# Patient Record
Sex: FEMALE | Race: WHITE | NOT HISPANIC OR LATINO | Employment: FULL TIME | URBAN - METROPOLITAN AREA
[De-identification: names, ages, dates, MRNs, and addresses within clinical notes are randomized per-mention and may not be internally consistent; named-entity substitution may affect disease eponyms.]

---

## 2022-04-19 DIAGNOSIS — Z36.89 ENCOUNTER FOR FETAL ANATOMIC SURVEY: ICD-10-CM

## 2022-04-19 DIAGNOSIS — O28.3 ABNORMAL FETAL ULTRASOUND: Primary | ICD-10-CM

## 2022-04-27 ENCOUNTER — PATIENT MESSAGE (OUTPATIENT)
Dept: MATERNAL FETAL MEDICINE | Facility: CLINIC | Age: 27
End: 2022-04-27
Payer: MEDICAID

## 2022-04-28 ENCOUNTER — PROCEDURE VISIT (OUTPATIENT)
Dept: MATERNAL FETAL MEDICINE | Facility: CLINIC | Age: 27
End: 2022-04-28
Attending: OBSTETRICS & GYNECOLOGY
Payer: MEDICAID

## 2022-04-28 VITALS
DIASTOLIC BLOOD PRESSURE: 65 MMHG | SYSTOLIC BLOOD PRESSURE: 115 MMHG | HEIGHT: 63 IN | BODY MASS INDEX: 34.3 KG/M2 | WEIGHT: 193.56 LBS

## 2022-04-28 DIAGNOSIS — O28.3 ABNORMAL FETAL ULTRASOUND: ICD-10-CM

## 2022-04-28 DIAGNOSIS — Z36.89 ENCOUNTER FOR ULTRASOUND TO ASSESS FETAL GROWTH: ICD-10-CM

## 2022-04-28 DIAGNOSIS — Z36.89 ENCOUNTER FOR FETAL ANATOMIC SURVEY: ICD-10-CM

## 2022-04-28 DIAGNOSIS — Z87.51 HISTORY OF PRETERM LABOR: ICD-10-CM

## 2022-04-28 DIAGNOSIS — O35.EXX0 FETAL RENAL ANOMALY, SINGLE GESTATION: ICD-10-CM

## 2022-04-28 DIAGNOSIS — O35.EXX0 FETAL RENAL ANOMALY, SINGLE GESTATION: Primary | ICD-10-CM

## 2022-04-28 PROCEDURE — 3008F BODY MASS INDEX DOCD: CPT | Mod: CPTII,,, | Performed by: OBSTETRICS & GYNECOLOGY

## 2022-04-28 PROCEDURE — 3008F PR BODY MASS INDEX (BMI) DOCUMENTED: ICD-10-PCS | Mod: CPTII,,, | Performed by: OBSTETRICS & GYNECOLOGY

## 2022-04-28 PROCEDURE — 99204 PR OFFICE/OUTPT VISIT, NEW, LEVL IV, 45-59 MIN: ICD-10-PCS | Mod: 25,S$PBB,TH, | Performed by: OBSTETRICS & GYNECOLOGY

## 2022-04-28 PROCEDURE — 3078F DIAST BP <80 MM HG: CPT | Mod: CPTII,,, | Performed by: OBSTETRICS & GYNECOLOGY

## 2022-04-28 PROCEDURE — 99999 PR PBB SHADOW E&M-EST. PATIENT-LVL III: ICD-10-PCS | Mod: PBBFAC,,, | Performed by: OBSTETRICS & GYNECOLOGY

## 2022-04-28 PROCEDURE — 1159F MED LIST DOCD IN RCRD: CPT | Mod: CPTII,,, | Performed by: OBSTETRICS & GYNECOLOGY

## 2022-04-28 PROCEDURE — 3074F SYST BP LT 130 MM HG: CPT | Mod: CPTII,,, | Performed by: OBSTETRICS & GYNECOLOGY

## 2022-04-28 PROCEDURE — 99213 OFFICE O/P EST LOW 20 MIN: CPT | Mod: PBBFAC,TH | Performed by: OBSTETRICS & GYNECOLOGY

## 2022-04-28 PROCEDURE — 99999 PR PBB SHADOW E&M-EST. PATIENT-LVL III: CPT | Mod: PBBFAC,,, | Performed by: OBSTETRICS & GYNECOLOGY

## 2022-04-28 PROCEDURE — 1160F RVW MEDS BY RX/DR IN RCRD: CPT | Mod: CPTII,,, | Performed by: OBSTETRICS & GYNECOLOGY

## 2022-04-28 PROCEDURE — 3078F PR MOST RECENT DIASTOLIC BLOOD PRESSURE < 80 MM HG: ICD-10-PCS | Mod: CPTII,,, | Performed by: OBSTETRICS & GYNECOLOGY

## 2022-04-28 PROCEDURE — 99204 OFFICE O/P NEW MOD 45 MIN: CPT | Mod: 25,S$PBB,TH, | Performed by: OBSTETRICS & GYNECOLOGY

## 2022-04-28 PROCEDURE — 76811 OB US DETAILED SNGL FETUS: CPT | Mod: PBBFAC | Performed by: OBSTETRICS & GYNECOLOGY

## 2022-04-28 PROCEDURE — 1160F PR REVIEW ALL MEDS BY PRESCRIBER/CLIN PHARMACIST DOCUMENTED: ICD-10-PCS | Mod: CPTII,,, | Performed by: OBSTETRICS & GYNECOLOGY

## 2022-04-28 PROCEDURE — 3074F PR MOST RECENT SYSTOLIC BLOOD PRESSURE < 130 MM HG: ICD-10-PCS | Mod: CPTII,,, | Performed by: OBSTETRICS & GYNECOLOGY

## 2022-04-28 PROCEDURE — 1159F PR MEDICATION LIST DOCUMENTED IN MEDICAL RECORD: ICD-10-PCS | Mod: CPTII,,, | Performed by: OBSTETRICS & GYNECOLOGY

## 2022-04-28 PROCEDURE — 76811 US MFM PROCEDURE (VIEWPOINT): ICD-10-PCS | Mod: 26,S$PBB,, | Performed by: OBSTETRICS & GYNECOLOGY

## 2022-04-28 RX ORDER — NIFEDIPINE 10 MG/1
10 CAPSULE ORAL EVERY 8 HOURS
COMMUNITY

## 2022-04-28 RX ORDER — PROMETHAZINE HYDROCHLORIDE 25 MG/1
25 TABLET ORAL EVERY 4 HOURS
COMMUNITY

## 2022-04-28 RX ORDER — HYDROXYPROGESTERONE CAPROATE 250 MG/ML
250 INJECTION INTRAMUSCULAR
COMMUNITY

## 2022-04-28 RX ORDER — NAPROXEN SODIUM 220 MG/1
81 TABLET, FILM COATED ORAL DAILY
Refills: 0 | COMMUNITY
Start: 2022-04-28 | End: 2022-09-25

## 2022-04-28 NOTE — ASSESSMENT & PLAN NOTE
Today Olivia's fetus was diagnosed with bilateral renal pyelectasis.  Based on the prenatal rating system proposed by Daxa et al in the Journal of Pediatric Urology (2014), we grade urinary tract dilation in a manner that correlates with long-term risk (UTD A1 or A2-3). By this criteria, her measurement of 7.5 mm on the right and 6.8 mm on the left without other abnormal findings puts her fetus in the moderate risk category, A2-3. The cause of urinary tract dilation is most often physiologic, particularly for fetuses in the low risk category.  Other causes can include anatomic obstructions, with a prevalence ranging between 5 and 30% of diagnosed fetuses. There is a small association with trisomy 21, with a relative risk of 1.6, though patient's low risk cell free DNA precludes further work up.    Recommendations:  1. Follow up US at 32 weeks  2. Will determine at that time need for prenatal Pediatric Urology consultation on  imaging based on persistence/degree of dilation  3. Fetal UTD does not typically alter place or mode of delivery

## 2022-04-28 NOTE — ASSESSMENT & PLAN NOTE
labor in 3 prior pregnancies with subsequent term deliveries in all  Current regimen (2022):   - 17-OHP weekly injections  - Nifedipine IR 10 mg PO every 4-6 hours as needed for  contractions    Recommendations:  1. 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 Avoid nifedipine in the setting of hypotension. Avoid beta-2 agonists altogether.

## 2022-04-28 NOTE — PROGRESS NOTES
MATERNAL-FETAL MEDICINE   CONSULT NOTE    Provider requesting consultation: Dr. Mallory    SUBJECTIVE:     Ms. Olivia Woodruff is a 26 y.o.  female with IUP at 23w3d who is seen in consultation by MFM for evaluation and management of:  Problem   Fetal Renal Anomaly, Single Gestation            Medication List with Changes/Refills   Current Medications    HYDROXYPROGESTERONE (ROLO) 250 MG/ML (1 ML) OIL    Inject 250 mg into the muscle every 7 days.    NIFEDIPINE (PROCARDIA) 10 MG CAP    Take 10 mg by mouth every 8 (eight) hours.    ONDANSETRON HCL (ZOFRAN) 24 MG TABLET    Take 24 mg by mouth once.    PRENATAL VIT NO.124/IRON/FOLIC (PRENATAL VITAMIN ORAL)    Take by mouth Daily.    PROMETHAZINE (PHENERGAN) 25 MG TABLET    Take 25 mg by mouth every 4 (four) hours.       Review of patient's allergies indicates:   Allergen Reactions    Dilaudid [hydromorphone]     Latex, natural rubber        PMH:  Past Medical History:   Diagnosis Date    History of  labor     PCOS (polycystic ovarian syndrome)     Polycystic ovaries        PObHx:  OB History    Para Term  AB Living   6 3 3 0 2 3   SAB IAB Ectopic Multiple Live Births   2 0 0 0 3      # Outcome Date GA Lbr Jonh/2nd Weight Sex Delivery Anes PTL Lv   6 Current            5 Term 10/31/17 39w0d  3.685 kg (8 lb 2 oz) M Vag-Spont  Y SUSAN      Complications:  labor   4 2016 12w0d          3 Term 10/01/14 37w0d  3.09 kg (6 lb 13 oz) M Vag-Spont  Y SUSAN      Complications:  labor   2 Term 12 39w0d  4.026 kg (8 lb 14 oz) M Vag-Spont  Y SUSAN      Complications:  labor   1 2012 12w0d              PSH:  Past Surgical History:   Procedure Laterality Date    APPENDECTOMY      ARM SURGERY Bilateral [id number]    TONSILS  2003       Family history:family history is not on file.    Social history: reports that she has quit smoking. Her smoking use included cigarettes. She has never used smokeless tobacco. She reports  "previous alcohol use. She reports that she does not use drugs.    Genetic history: The patient denies any inherited genetic diseases or birth defects in herself or her partner's personal history or family.    Objective:   /65 (BP Location: Left arm, Patient Position: Sitting, BP Method: Large (Automatic))   Ht 5' 3" (1.6 m)   Wt 87.8 kg (193 lb 9 oz)   BMI 34.29 kg/m²     Ultrasound performed. See viewpoint for full ultrasound report.  1. A detailed fetal anatomic ultrasound examination was performed for the following high risk indication: renal pyelectasis  2. Bilateral renal pelvis dilation was identified on ultrasound today, measuring 7.5 mm on the right and 6.8 mm on the left, consistent with UTD A2-3 (moderate risk). This is a common and typically benign finding during the second trimester, occurring in 1 in 20 fetuses. However, it may herald the onset of true renal tract obstruction or reflux. Follow-up sonography at 32 - 34 weeks is recommended to determine need for   evaluation. Targeted fetal anatomic views reveal no other fetal abnormalities, though fetal imaging remains incomplete with limited AA and spine. Neither major structural abnormalities nor soft markers associated with Down Syndrome were seen. The patient is at low risk for Down Syndrome based on negative cell free screening. Because the association between isolated renal pelvis dilation and Down Syndrome is weak or uncertain, no further testing for Down Syndrome is recommended. No counseling regarding renal pelvis dilation and Down Syndrome was performed today.   3. Fetal size today is consistent with established gestational age.  4. Cervical length is normal.  5. Placental location is anterior without evidence of previa.  6. Amniotic fluid volume appears normal.     Significant labs/imaging:  Cell free DNA: Low risk    ASSESSMENT/PLAN:     26 y.o.  female with IUP at 23w3d     Fetal renal anomaly, single " gestation  Today Olivia's fetus was diagnosed with bilateral renal pyelectasis.  Based on the prenatal rating system proposed by Daxa et al in the Journal of Pediatric Urology (2014), we grade urinary tract dilation in a manner that correlates with long-term risk (UTD A1 or A2-3). By this criteria, her measurement of 7.5 mm on the right and 6.8 mm on the left without other abnormal findings puts her fetus in the moderate risk category, A2-3. The cause of urinary tract dilation is most often physiologic, particularly for fetuses in the low risk category.  Other causes can include anatomic obstructions, with a prevalence ranging between 5 and 30% of diagnosed fetuses. There is a small association with trisomy 21, with a relative risk of 1.6, though patient's low risk cell free DNA precludes further work up.    Recommendations:  1. Follow up US at 32 weeks  2. Will determine at that time need for prenatal Pediatric Urology consultation on  imaging based on persistence/degree of dilation  3. Fetal UTD does not typically alter place or mode of delivery    History of  labor   labor in 3 prior pregnancies with subsequent term deliveries in all  Current regimen (2022):   - 17-OHP weekly injections  - Nifedipine IR 10 mg PO every 4-6 hours as needed for  contractions    Recommendations:  1. 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 Avoid nifedipine in the setting of hypotension. Avoid beta-2 agonists altogether.    FOLLOW UP:   F/u in 9 weeks for US/MFM visit        Kwame Do III  Maternal-Fetal Medicine    Electronically Signed by Kwame Do III 2022

## 2022-05-10 ENCOUNTER — TELEPHONE (OUTPATIENT)
Dept: MATERNAL FETAL MEDICINE | Facility: CLINIC | Age: 27
End: 2022-05-10
Payer: MEDICAID

## 2022-06-29 ENCOUNTER — OFFICE VISIT (OUTPATIENT)
Dept: MATERNAL FETAL MEDICINE | Facility: CLINIC | Age: 27
End: 2022-06-29
Payer: MEDICAID

## 2022-06-29 ENCOUNTER — PROCEDURE VISIT (OUTPATIENT)
Dept: MATERNAL FETAL MEDICINE | Facility: CLINIC | Age: 27
End: 2022-06-29
Payer: MEDICAID

## 2022-06-29 VITALS
WEIGHT: 200.63 LBS | SYSTOLIC BLOOD PRESSURE: 112 MMHG | BODY MASS INDEX: 35.54 KG/M2 | DIASTOLIC BLOOD PRESSURE: 63 MMHG

## 2022-06-29 DIAGNOSIS — Z36.89 ENCOUNTER FOR ULTRASOUND TO ASSESS FETAL GROWTH: Primary | ICD-10-CM

## 2022-06-29 DIAGNOSIS — O35.EXX0 FETAL RENAL ANOMALY, SINGLE GESTATION: ICD-10-CM

## 2022-06-29 DIAGNOSIS — Z87.51 HISTORY OF PRETERM LABOR: ICD-10-CM

## 2022-06-29 DIAGNOSIS — Z36.89 ENCOUNTER FOR ULTRASOUND TO ASSESS FETAL GROWTH: ICD-10-CM

## 2022-06-29 PROCEDURE — 99499 UNLISTED E&M SERVICE: CPT | Mod: S$GLB,,, | Performed by: OBSTETRICS & GYNECOLOGY

## 2022-06-29 PROCEDURE — 3078F PR MOST RECENT DIASTOLIC BLOOD PRESSURE < 80 MM HG: ICD-10-PCS | Mod: CPTII,S$GLB,, | Performed by: OBSTETRICS & GYNECOLOGY

## 2022-06-29 PROCEDURE — 3074F PR MOST RECENT SYSTOLIC BLOOD PRESSURE < 130 MM HG: ICD-10-PCS | Mod: CPTII,S$GLB,, | Performed by: OBSTETRICS & GYNECOLOGY

## 2022-06-29 PROCEDURE — 1159F PR MEDICATION LIST DOCUMENTED IN MEDICAL RECORD: ICD-10-PCS | Mod: CPTII,S$GLB,, | Performed by: OBSTETRICS & GYNECOLOGY

## 2022-06-29 PROCEDURE — 99499 NO LOS: ICD-10-PCS | Mod: S$GLB,,, | Performed by: OBSTETRICS & GYNECOLOGY

## 2022-06-29 PROCEDURE — 3008F BODY MASS INDEX DOCD: CPT | Mod: CPTII,S$GLB,, | Performed by: OBSTETRICS & GYNECOLOGY

## 2022-06-29 PROCEDURE — 3074F SYST BP LT 130 MM HG: CPT | Mod: CPTII,S$GLB,, | Performed by: OBSTETRICS & GYNECOLOGY

## 2022-06-29 PROCEDURE — 1159F MED LIST DOCD IN RCRD: CPT | Mod: CPTII,S$GLB,, | Performed by: OBSTETRICS & GYNECOLOGY

## 2022-06-29 PROCEDURE — 3078F DIAST BP <80 MM HG: CPT | Mod: CPTII,S$GLB,, | Performed by: OBSTETRICS & GYNECOLOGY

## 2022-06-29 PROCEDURE — 76816 OB US FOLLOW-UP PER FETUS: CPT | Mod: S$GLB,,, | Performed by: OBSTETRICS & GYNECOLOGY

## 2022-06-29 PROCEDURE — 76816 PR  US,PREGNANT UTERUS,F/U,TRANSABD APP: ICD-10-PCS | Mod: S$GLB,,, | Performed by: OBSTETRICS & GYNECOLOGY

## 2022-06-29 PROCEDURE — 3008F PR BODY MASS INDEX (BMI) DOCUMENTED: ICD-10-PCS | Mod: CPTII,S$GLB,, | Performed by: OBSTETRICS & GYNECOLOGY

## 2022-06-29 RX ORDER — PANTOPRAZOLE SODIUM 40 MG/1
40 TABLET, DELAYED RELEASE ORAL DAILY
COMMUNITY
Start: 2022-06-02

## 2022-07-27 ENCOUNTER — PROCEDURE VISIT (OUTPATIENT)
Dept: MATERNAL FETAL MEDICINE | Facility: CLINIC | Age: 27
End: 2022-07-27
Payer: MEDICAID

## 2022-07-27 ENCOUNTER — OFFICE VISIT (OUTPATIENT)
Dept: MATERNAL FETAL MEDICINE | Facility: CLINIC | Age: 27
End: 2022-07-27
Payer: MEDICAID

## 2022-07-27 VITALS
WEIGHT: 202.63 LBS | BODY MASS INDEX: 35.89 KG/M2 | DIASTOLIC BLOOD PRESSURE: 82 MMHG | SYSTOLIC BLOOD PRESSURE: 140 MMHG

## 2022-07-27 DIAGNOSIS — Z36.89 ENCOUNTER FOR ULTRASOUND TO ASSESS FETAL GROWTH: ICD-10-CM

## 2022-07-27 DIAGNOSIS — O35.EXX0 FETAL RENAL ANOMALY, SINGLE GESTATION: Primary | ICD-10-CM

## 2022-07-27 PROCEDURE — 1159F PR MEDICATION LIST DOCUMENTED IN MEDICAL RECORD: ICD-10-PCS | Mod: CPTII,S$GLB,, | Performed by: OBSTETRICS & GYNECOLOGY

## 2022-07-27 PROCEDURE — 3079F PR MOST RECENT DIASTOLIC BLOOD PRESSURE 80-89 MM HG: ICD-10-PCS | Mod: CPTII,S$GLB,, | Performed by: OBSTETRICS & GYNECOLOGY

## 2022-07-27 PROCEDURE — 1159F MED LIST DOCD IN RCRD: CPT | Mod: CPTII,S$GLB,, | Performed by: OBSTETRICS & GYNECOLOGY

## 2022-07-27 PROCEDURE — 3008F BODY MASS INDEX DOCD: CPT | Mod: CPTII,S$GLB,, | Performed by: OBSTETRICS & GYNECOLOGY

## 2022-07-27 PROCEDURE — 1160F PR REVIEW ALL MEDS BY PRESCRIBER/CLIN PHARMACIST DOCUMENTED: ICD-10-PCS | Mod: CPTII,S$GLB,, | Performed by: OBSTETRICS & GYNECOLOGY

## 2022-07-27 PROCEDURE — 76816 OB US FOLLOW-UP PER FETUS: CPT | Mod: S$GLB,,, | Performed by: OBSTETRICS & GYNECOLOGY

## 2022-07-27 PROCEDURE — 76816 PR  US,PREGNANT UTERUS,F/U,TRANSABD APP: ICD-10-PCS | Mod: S$GLB,,, | Performed by: OBSTETRICS & GYNECOLOGY

## 2022-07-27 PROCEDURE — 3077F PR MOST RECENT SYSTOLIC BLOOD PRESSURE >= 140 MM HG: ICD-10-PCS | Mod: CPTII,S$GLB,, | Performed by: OBSTETRICS & GYNECOLOGY

## 2022-07-27 PROCEDURE — 3008F PR BODY MASS INDEX (BMI) DOCUMENTED: ICD-10-PCS | Mod: CPTII,S$GLB,, | Performed by: OBSTETRICS & GYNECOLOGY

## 2022-07-27 PROCEDURE — 99212 PR OFFICE/OUTPT VISIT, EST, LEVL II, 10-19 MIN: ICD-10-PCS | Mod: TH,25,S$GLB, | Performed by: OBSTETRICS & GYNECOLOGY

## 2022-07-27 PROCEDURE — 3077F SYST BP >= 140 MM HG: CPT | Mod: CPTII,S$GLB,, | Performed by: OBSTETRICS & GYNECOLOGY

## 2022-07-27 PROCEDURE — 1160F RVW MEDS BY RX/DR IN RCRD: CPT | Mod: CPTII,S$GLB,, | Performed by: OBSTETRICS & GYNECOLOGY

## 2022-07-27 PROCEDURE — 3079F DIAST BP 80-89 MM HG: CPT | Mod: CPTII,S$GLB,, | Performed by: OBSTETRICS & GYNECOLOGY

## 2022-07-27 PROCEDURE — 99212 OFFICE O/P EST SF 10 MIN: CPT | Mod: TH,25,S$GLB, | Performed by: OBSTETRICS & GYNECOLOGY
